# Patient Record
Sex: FEMALE | Race: WHITE | NOT HISPANIC OR LATINO | Employment: UNEMPLOYED | URBAN - METROPOLITAN AREA
[De-identification: names, ages, dates, MRNs, and addresses within clinical notes are randomized per-mention and may not be internally consistent; named-entity substitution may affect disease eponyms.]

---

## 2020-12-22 PROBLEM — S32.810A CLOSED PELVIC RING FRACTURE: Status: ACTIVE | Noted: 2020-12-22

## 2020-12-22 PROBLEM — S34.22XA: Status: ACTIVE | Noted: 2020-12-22

## 2020-12-22 PROBLEM — S36.039A LACERATION OF SPLEEN: Status: ACTIVE | Noted: 2020-12-22

## 2020-12-22 PROBLEM — F15.90 STIMULANT USE DISORDER: Status: ACTIVE | Noted: 2017-12-04

## 2020-12-22 PROBLEM — F33.3 SEVERE RECURRENT MAJOR DEPRESSIVE DISORDER WITH PSYCHOTIC FEATURES WITH ANXIOUS DISTRESS: Status: ACTIVE | Noted: 2017-12-04

## 2020-12-22 PROBLEM — F31.81 BIPOLAR II DISORDER: Status: ACTIVE | Noted: 2017-12-11
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
## 2020-12-22 PROBLEM — U07.1 COVID-19 VIRUS DETECTED: Status: ACTIVE | Noted: 2020-12-22

## 2020-12-22 PROBLEM — F17.200 NICOTINE DEPENDENCE: Status: ACTIVE | Noted: 2017-12-11

## 2020-12-22 PROBLEM — F32.9 MAJOR DEPRESSIVE DISORDER: Status: ACTIVE | Noted: 2017-12-04

## 2020-12-22 PROBLEM — S36.039A SPLENIC LACERATION, INITIAL ENCOUNTER: Status: ACTIVE | Noted: 2020-12-22

## 2020-12-22 PROBLEM — S27.0XXA TRAUMATIC PNEUMOTHORAX: Status: ACTIVE | Noted: 2020-12-22

## 2020-12-24 PROBLEM — S32.9XXA CLOSED DISPLACED FRACTURE OF PELVIS: Status: ACTIVE | Noted: 2020-12-22

## 2020-12-25 PROBLEM — R41.0 DISORIENTATED: Status: ACTIVE | Noted: 2020-12-25

## 2020-12-28 PROBLEM — F31.9 BIPOLAR 1 DISORDER: Status: ACTIVE | Noted: 2020-12-28

## 2020-12-28 PROBLEM — N39.0 UTI (URINARY TRACT INFECTION): Status: ACTIVE | Noted: 2020-12-28

## 2020-12-28 PROBLEM — R41.0 DISORIENTATED: Status: RESOLVED | Noted: 2020-12-25 | Resolved: 2020-12-28

## 2020-12-29 PROBLEM — R07.9 CHEST PAIN: Status: ACTIVE | Noted: 2020-12-29

## 2020-12-29 PROBLEM — R41.82 ALTERED MENTAL STATUS: Status: ACTIVE | Noted: 2020-12-29

## 2020-12-31 PROBLEM — R07.9 CHEST PAIN: Status: RESOLVED | Noted: 2020-12-29 | Resolved: 2020-12-31

## 2021-01-06 PROBLEM — N39.0 UTI (URINARY TRACT INFECTION): Status: RESOLVED | Noted: 2020-12-28 | Resolved: 2021-01-06

## 2021-01-06 PROBLEM — R41.82 ALTERED MENTAL STATUS: Status: RESOLVED | Noted: 2020-12-29 | Resolved: 2021-01-06

## 2021-01-21 PROBLEM — M54.9 BACK PAIN: Status: ACTIVE | Noted: 2021-01-21

## 2021-01-26 PROBLEM — F15.90 STIMULANT USE DISORDER: Status: RESOLVED | Noted: 2017-12-04 | Resolved: 2021-01-26

## 2021-01-26 PROBLEM — F17.200 NICOTINE DEPENDENCE: Status: RESOLVED | Noted: 2017-12-11 | Resolved: 2021-01-26

## 2024-01-30 PROBLEM — F33.1 MAJOR DEPRESSIVE DISORDER, RECURRENT EPISODE, MODERATE WITH ANXIOUS DISTRESS: Status: ACTIVE | Noted: 2024-01-30

## 2024-01-30 PROBLEM — F06.70: Status: ACTIVE | Noted: 2024-01-30

## 2024-01-30 PROBLEM — F33.3 SEVERE RECURRENT MAJOR DEPRESSIVE DISORDER WITH PSYCHOTIC FEATURES WITH ANXIOUS DISTRESS: Status: RESOLVED | Noted: 2017-12-04 | Resolved: 2024-01-30

## 2024-01-30 PROBLEM — F31.81 BIPOLAR II DISORDER: Status: RESOLVED | Noted: 2017-12-11 | Resolved: 2024-01-30

## 2024-01-30 PROBLEM — F31.9 BIPOLAR 1 DISORDER: Status: RESOLVED | Noted: 2020-12-28 | Resolved: 2024-01-30

## 2024-01-30 PROBLEM — R44.2 HYPNAGOGIC HALLUCINATIONS: Status: ACTIVE | Noted: 2024-01-30

## 2024-01-30 PROBLEM — F32.9 MAJOR DEPRESSIVE DISORDER: Status: RESOLVED | Noted: 2017-12-04 | Resolved: 2024-01-30

## 2024-01-31 ENCOUNTER — TELEPHONE (OUTPATIENT)
Dept: ADMINISTRATIVE | Facility: OTHER | Age: 44
End: 2024-01-31

## 2024-01-31 NOTE — TELEPHONE ENCOUNTER
Sw received a consult to assist Patient in the process for applying for disability. Sw called Patient ([phone]). A voice message was left requesting she call back.    Sarah Oh LCSW    [phone]

## 2024-02-01 ENCOUNTER — TELEPHONE (OUTPATIENT)
Dept: ADMINISTRATIVE | Facility: OTHER | Age: 44
End: 2024-02-01

## 2024-02-01 NOTE — TELEPHONE ENCOUNTER
Sw received a consult to assist Patient with the disability process. Elías called and spoke to Patient ([phone]). She explained she had disability income years ago. She lost it and has since tried to get it reinstated. Patient applied about a year ago. She has legal representation through [employer]. Patient received some paperwork and doesn't know what to do with it. Her physicians have told her they don't fill out paperwork like that. Sw encouraged Patient to contact [employer] and ask what they suggest. She should also ask about her application and where it stands. Patient verbalized understanding and appreciation.    Sarah Oh LCSW    [phone]

## 2024-04-12 PROBLEM — G47.00 INSOMNIA: Status: ACTIVE | Noted: 2024-04-12

## 2024-04-12 PROBLEM — G47.9 SLEEP DIFFICULTIES: Status: ACTIVE | Noted: 2024-04-12

## 2024-06-07 ENCOUNTER — SOCIAL WORK (OUTPATIENT)
Dept: ADMINISTRATIVE | Facility: OTHER | Age: 44
End: 2024-06-07

## 2024-06-07 PROBLEM — F43.10 PTSD (POST-TRAUMATIC STRESS DISORDER): Status: ACTIVE | Noted: 2024-06-07

## 2024-06-07 NOTE — PROGRESS NOTES
Sw received a consult to assist with counseling services. Sw called Patient ([phone]). A voice message was left requesting she call back.    Sarah Oh LCSW    [phone]

## 2024-06-10 ENCOUNTER — SOCIAL WORK (OUTPATIENT)
Dept: ADMINISTRATIVE | Facility: OTHER | Age: 44
End: 2024-06-10

## 2024-06-10 NOTE — PROGRESS NOTES
Sw received a consult to assist with counseling services. Elías called and spoke to Patient ([phone]). She is agreeable to counseling. Elías faxed a referral to [facility] to review.    Sarah Oh LCSW    [phone]

## 2024-06-11 ENCOUNTER — SOCIAL WORK (OUTPATIENT)
Dept: ADMINISTRATIVE | Facility: OTHER | Age: 44
End: 2024-06-11

## 2024-06-11 NOTE — PROGRESS NOTES
Sw received a message from rIene at [facility]. She has contacted Patient and scheduled her an assessment.    [facility]  [address] [place] [address]  [phone]  Appt:  6/17/2024 at 2:00 pm    Sarah Oh LCSW    [phone]